# Patient Record
Sex: MALE | Race: WHITE | NOT HISPANIC OR LATINO | Employment: FULL TIME | URBAN - NONMETROPOLITAN AREA
[De-identification: names, ages, dates, MRNs, and addresses within clinical notes are randomized per-mention and may not be internally consistent; named-entity substitution may affect disease eponyms.]

---

## 2018-04-16 ENCOUNTER — HOSPITAL ENCOUNTER (EMERGENCY)
Facility: HOSPITAL | Age: 33
Discharge: HOME | End: 2018-04-16
Attending: EMERGENCY MEDICINE | Admitting: EMERGENCY MEDICINE

## 2018-04-16 VITALS
BODY MASS INDEX: 30.8 KG/M2 | SYSTOLIC BLOOD PRESSURE: 122 MMHG | RESPIRATION RATE: 18 BRPM | HEART RATE: 63 BPM | DIASTOLIC BLOOD PRESSURE: 74 MMHG | WEIGHT: 220 LBS | TEMPERATURE: 98.1 F | HEIGHT: 71 IN | OXYGEN SATURATION: 100 %

## 2018-04-16 DIAGNOSIS — J06.9 UPPER RESPIRATORY TRACT INFECTION, UNSPECIFIED TYPE: Primary | ICD-10-CM

## 2018-04-16 PROCEDURE — 99282 EMERGENCY DEPT VISIT SF MDM: CPT

## 2018-04-16 RX ORDER — LOSARTAN POTASSIUM 25 MG/1
25 TABLET ORAL DAILY
COMMUNITY

## 2018-04-16 RX ORDER — BUSPIRONE HYDROCHLORIDE 10 MG/1
10 TABLET ORAL 3 TIMES DAILY
COMMUNITY

## 2018-04-16 NOTE — ED PROVIDER NOTES
Subjective   32-year-old male presenting with cough, congestion.  He states that for the last several weeks he has had intermittent congestion, cough.  Has also had subjective fevers.  Has been taking ibuprofen and Tylenol.  These have helped the symptoms.  He has been seen at an outside emergency department and was tested for strep and flu which were negative.  Also recently had labs and chest x-ray which were reported as negative.  States he is just frustrated because he cannot go to work.  He denies any nausea, vomiting, diarrhea, abdominal pain, chest pain, shortness of breath.  No sick contacts.            Review of Systems   Constitutional: Positive for fatigue and fever. Negative for chills.   HENT: Positive for congestion and rhinorrhea. Negative for sore throat.    Eyes: Negative for pain.   Respiratory: Positive for cough. Negative for shortness of breath.    Cardiovascular: Negative for chest pain, palpitations and leg swelling.   Gastrointestinal: Negative for abdominal pain, diarrhea, nausea and vomiting.   Genitourinary: Negative for dysuria.   Musculoskeletal: Negative for arthralgias.   Skin: Negative for rash.   Neurological: Negative for weakness and numbness.   Psychiatric/Behavioral: Negative for behavioral problems.       Past Medical History:   Diagnosis Date   • Anxiety    • Hypertension    • PTSD (post-traumatic stress disorder)        Allergies   Allergen Reactions   • Penicillins Unknown (See Comments)     Unknown- states from when he was little.        History reviewed. No pertinent surgical history.    History reviewed. No pertinent family history.    Social History     Social History Main Topics   • Smoking status: Current Every Day Smoker     Packs/day: 0.50     Years: 15.00     Types: Cigarettes   • Alcohol use No   • Drug use: No     Other Topics Concern   • Not on file           Objective   Physical Exam   Constitutional: He is oriented to person, place, and time. He appears  well-developed and well-nourished. No distress.   HENT:   Head: Normocephalic and atraumatic.   Right Ear: External ear normal.   Left Ear: External ear normal.   Nose: Nose normal.   Mild erythema to the oropharynx, no swelling or exudate   Eyes: Conjunctivae and EOM are normal. Pupils are equal, round, and reactive to light.   Neck: Normal range of motion. Neck supple.   Cardiovascular: Normal rate, regular rhythm, normal heart sounds and intact distal pulses.    Pulmonary/Chest: Effort normal and breath sounds normal. No respiratory distress. He has no wheezes. He has no rales.   Abdominal: Soft. Bowel sounds are normal. He exhibits no distension. There is no tenderness. There is no rebound and no guarding.   Musculoskeletal: Normal range of motion. He exhibits no edema, tenderness or deformity.   Neurological: He is alert and oriented to person, place, and time.   Skin: Skin is warm and dry. No rash noted.   Psychiatric: He has a normal mood and affect. His behavior is normal.   Nursing note and vitals reviewed.      Procedures         ED Course  ED Course                  MDM  Number of Diagnoses or Management Options  Upper respiratory tract infection, unspecified type:   Diagnosis management comments: 32-year-old male with multiple symptoms.  Was woken well-nourished man in no distress with normal vital signs and exam as above.  Likely has some sort of nonspecific upper respiratory infection.  Likely made worse by his smoking.  Encouraged him to continue using his albuterol inhaler, rest, push fluids.  Encouraged to follow up closely with his primary doctor for recheck versus return here if worse.  He is comfortable with and understanding of the plan.    DDX: Viral upper respiratory infection      Final diagnoses:   Upper respiratory tract infection, unspecified type            Ashish Fishman MD  04/16/18 0482